# Patient Record
Sex: MALE | Race: WHITE
[De-identification: names, ages, dates, MRNs, and addresses within clinical notes are randomized per-mention and may not be internally consistent; named-entity substitution may affect disease eponyms.]

---

## 2018-09-28 ENCOUNTER — HOSPITAL ENCOUNTER (EMERGENCY)
Dept: HOSPITAL 53 - M ED | Age: 23
Discharge: HOME | End: 2018-09-28
Payer: COMMERCIAL

## 2018-09-28 DIAGNOSIS — Z87.891: ICD-10-CM
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
## 2018-09-28 DIAGNOSIS — S76.312A: Primary | ICD-10-CM
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
## 2018-09-28 PROCEDURE — 99282 EMERGENCY DEPT VISIT SF MDM: CPT

## 2020-08-31 ENCOUNTER — HOSPITAL ENCOUNTER (EMERGENCY)
Dept: HOSPITAL 53 - M ED | Age: 25
LOS: 1 days | Discharge: HOME | End: 2020-09-01
Payer: COMMERCIAL

## 2020-08-31 VITALS — HEIGHT: 68 IN | BODY MASS INDEX: 26.9 KG/M2 | WEIGHT: 177.47 LBS

## 2020-08-31 DIAGNOSIS — Z20.828: ICD-10-CM

## 2020-08-31 DIAGNOSIS — R07.89: Primary | ICD-10-CM

## 2020-08-31 PROCEDURE — 99283 EMERGENCY DEPT VISIT LOW MDM: CPT

## 2020-09-01 VITALS — SYSTOLIC BLOOD PRESSURE: 119 MMHG | DIASTOLIC BLOOD PRESSURE: 80 MMHG

## 2021-11-06 ENCOUNTER — HOSPITAL ENCOUNTER (EMERGENCY)
Dept: HOSPITAL 53 - M ED | Age: 26
LOS: 1 days | Discharge: HOME | End: 2021-11-07
Payer: COMMERCIAL

## 2021-11-06 VITALS — HEIGHT: 67 IN | WEIGHT: 179.9 LBS | BODY MASS INDEX: 28.24 KG/M2

## 2021-11-06 DIAGNOSIS — Z79.899: ICD-10-CM

## 2021-11-06 DIAGNOSIS — K08.89: Primary | ICD-10-CM

## 2021-11-06 PROCEDURE — 99283 EMERGENCY DEPT VISIT LOW MDM: CPT

## 2021-11-06 PROCEDURE — 96372 THER/PROPH/DIAG INJ SC/IM: CPT

## 2021-11-06 NOTE — CCD
Summarization Of Episode

                             Created on: 2021



RANJITDAVIDIN RAYMON

External Reference #: [id number]

: 1995

Sex: Undifferentiated



Demographics





                          Address                   [address] [place]  [address]

 

                          Home Phone                [phone]

 

                          Preferred Language        English

 

                          Marital Status            Unknown

 

                          Alevism Affiliation     Unknown

 

                          Race                      Unknown

 

                          Ethnic Group              Not  or 





Author





                          Author                    [facility]

 

                          Organization              [facility]

 

                          Address                   Unknown

 

                          Phone                     Unavailable







Support





                Name            Relationship    Address         Phone

 

                    SAMMY CHURCH    Next Of Kin         [address], [place]  [id number] [phone]

 

                     [employer]             Next Of Kin         [address]

[place]

[place]  [address]                    Unavailable

 

                    GET  SAMMY Next Of Kin         [address] 

[place] [id number] [phone]



                                  



Re-disclosure Warning

          The records that you are about to access may contain information from 
federally-assisted alcohol or drug abuse programs. If such information is 
present, then the following federally mandated warning applies: This information
has been disclosed to you from records protected by federal confidentiality 
rules (42 CFR part 2). The federal rules prohibit you from making any further 
disclosure of this information unless further disclosure is expressly permitted 
by the written consent of the person to whom it pertains or as otherwise 
permitted by 42 CFR part 2. A general authorization for the release of medical 
or other information is NOT sufficient for this purpose. The Federal rules 
restrict any use of the information to criminally investigate or prosecute any 
alcohol or drug abuse patient.The records that you are about to access may 
contain highly sensitive health information, the redisclosure of which is 
protected by Article 27-F of the [facility] Public Health law. If you 
continue you may have access to information: Regarding HIV / AIDS; Provided by 
facilities licensed or operated by the [facility] Office of Mental Health; 
or Provided by the [facility] Office for People With Developmental 
Disabilities. If such information is present, then the following [place] 
mandated warning applies: This information has been disclosed to you from 
confidential records which are protected by state law. State law prohibits you 
from making any further disclosure of this information without the specific 
written consent of the person to whom it pertains, or as otherwise permitted by 
law. Any unauthorized further disclosure in violation of state law may result in
a fine or custodial sentence or both. A general authorization for the release of 
medical or other information is NOT sufficient authorization for further disc
losure.                                                                         
    



Immunizations

          



             Vaccine      Date         Status       Description  Data Source(s)

 

                          FLU VACCINE QUADRIV [id number](4 YEARS AND OLDER)CELL D

ERIVED 12/15/2020 12:00:00

AM EST              completed                               [facility]



                                                                                
       



Medications

          No Information                                                        
 



Insurance Providers

          



             Payer name   Policy type / Coverage type Policy ID    Covered party

 ID Covered 

party's relationship to phillip Policy Phillip             Plan Information

 

          [facility] [employer]           [id number]                        

     [id number]

 

                              [id number]                               [id number]

[id number]



                                                                                
                 



Problems, Conditions, and Diagnoses

          No Information                                                        
                               



Surgeries/Procedures

          No Information                                                        
                     



Results

          No Information                                                        
                     



Social History

          No Information

## 2021-11-07 VITALS — DIASTOLIC BLOOD PRESSURE: 95 MMHG | SYSTOLIC BLOOD PRESSURE: 137 MMHG

## 2021-11-07 NOTE — CCD
Summarization Of Episode

                             Created on: 2021



RANJITDAVIDIN RAYMON

External Reference #: [id number]

: 1995

Sex: Undifferentiated



Demographics





                          Address                   [address] [place]  [address]

 

                          Home Phone                [phone]

 

                          Preferred Language        English

 

                          Marital Status            Unknown

 

                          Anabaptist Affiliation     Unknown

 

                          Race                      Unknown

 

                          Ethnic Group              Not  or 





Author





                          Author                    [facility]

 

                          Organization              [facility]

 

                          Address                   Unknown

 

                          Phone                     Unavailable







Support





                Name            Relationship    Address         Phone

 

                    SAMMY CHURCH    Next Of Kin         [address], [place]  [id number] [phone]

 

                     [employer]             Next Of Kin         [address]

[place]

[place]  [address]                    Unavailable

 

                    GET  SAMMY Next Of Kin         [address] 

[place] [address] [phone]



                                  



Re-disclosure Warning

    



Immunizations

          



             Vaccine      Date         Status       Description  Data Source(s)

 

                          FLU VACCINE QUADRIV [id number](4 YEARS AND OLDER)CELL D

ERIVED 12/15/2020 12:00:00

AM EST              completed                               [facility]



                                                                                
       



Medications

          No Information                                                        
 



Insurance Providers

          



             Payer name   Policy type / Coverage type Policy ID    Covered party

 ID Covered 

party's relationship to phillip Policy Phillip             Plan Information

 

          [facility] [employer]           [id number]                        

     [id number]

 

                              [id number]                               [id number]

[id number]



                                                                                
                 



Problems, Conditions, and Diagnoses

          No Information                                                        
                               



Surgeries/Procedures

          No Information                                                        
                     



Results

          No Information                                                        
                     



Social History

          No Information